# Patient Record
Sex: FEMALE | Race: OTHER | NOT HISPANIC OR LATINO | URBAN - METROPOLITAN AREA
[De-identification: names, ages, dates, MRNs, and addresses within clinical notes are randomized per-mention and may not be internally consistent; named-entity substitution may affect disease eponyms.]

---

## 2017-09-11 ENCOUNTER — EMERGENCY (EMERGENCY)
Facility: HOSPITAL | Age: 72
LOS: 1 days | Discharge: HOME | End: 2017-09-11
Attending: STUDENT IN AN ORGANIZED HEALTH CARE EDUCATION/TRAINING PROGRAM
Payer: SELF-PAY

## 2017-09-11 VITALS
SYSTOLIC BLOOD PRESSURE: 151 MMHG | HEART RATE: 60 BPM | RESPIRATION RATE: 18 BRPM | TEMPERATURE: 97 F | DIASTOLIC BLOOD PRESSURE: 81 MMHG | OXYGEN SATURATION: 99 %

## 2017-09-11 VITALS
OXYGEN SATURATION: 94 % | DIASTOLIC BLOOD PRESSURE: 76 MMHG | RESPIRATION RATE: 16 BRPM | TEMPERATURE: 98 F | SYSTOLIC BLOOD PRESSURE: 176 MMHG | HEART RATE: 72 BPM | WEIGHT: 143.08 LBS

## 2017-09-11 DIAGNOSIS — Z90.49 ACQUIRED ABSENCE OF OTHER SPECIFIED PARTS OF DIGESTIVE TRACT: Chronic | ICD-10-CM

## 2017-09-11 PROCEDURE — 99285 EMERGENCY DEPT VISIT HI MDM: CPT

## 2017-09-11 PROCEDURE — 70450 CT HEAD/BRAIN W/O DYE: CPT | Mod: 26

## 2017-09-11 PROCEDURE — 70486 CT MAXILLOFACIAL W/O DYE: CPT | Mod: 26

## 2017-09-11 RX ORDER — ACETAMINOPHEN 500 MG
975 TABLET ORAL ONCE
Qty: 0 | Refills: 0 | Status: COMPLETED | OUTPATIENT
Start: 2017-09-11 | End: 2017-09-11

## 2017-09-11 RX ADMIN — Medication 975 MILLIGRAM(S): at 20:14

## 2017-09-11 NOTE — ED STATDOCS - PROGRESS NOTE DETAILS
PA NOTE: Pt seen by intake physician and hpi/orders/plan reviewed. HPI confirmed. Physical confirmed. will follow up plan per intake. Patient cleared by trauma. Will follow-up with plastic surgeon in [place]. unable to print or send prescription to pharmacy. Called prescription.

## 2017-09-11 NOTE — ED ADULT TRIAGE NOTE - CHIEF COMPLAINT QUOTE
[place] and pain to the left side of her face. Went to the clinic today and sent here for HTN.  complaining of headache and nosebleed.

## 2017-09-11 NOTE — ED STATDOCS - ATTENDING CONTRIBUTION TO CARE
I, Negro Trimble, performed the initial face to face bedside interview with this patient regarding history of present illness, review of symptoms and relevant past medical, social and family history.  I completed an independent physical examination.  I was the initial provider who evaluated this patient. I have signed out the follow up of any pending tests (i.e. labs, radiological studies) to the ACP.  I have communicated the patient’s plan of care and disposition with the ACP.  The history, relevant review of systems, past medical and surgical history, medical decision making, and physical examination was documented by the scribe in my presence and I attest to the accuracy of the documentation.
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
## 2017-09-12 PROCEDURE — 70450 CT HEAD/BRAIN W/O DYE: CPT

## 2017-09-12 PROCEDURE — 99284 EMERGENCY DEPT VISIT MOD MDM: CPT | Mod: 25

## 2017-09-12 PROCEDURE — 70486 CT MAXILLOFACIAL W/O DYE: CPT

## 2017-09-12 RX ADMIN — Medication 1 TABLET(S): at 02:21

## 2017-09-12 NOTE — CONSULT NOTE ADULT - ATTENDING COMMENTS
The patient was seen and examined  Fell four days prior  Facial fractures as noted  These will likely be non-operatively managed  Can follow-up as an outpatient with a plastic or OMFS surgeon

## 2017-09-12 NOTE — CONSULT NOTE ADULT - SUBJECTIVE AND OBJECTIVE BOX
HPI:  71 y/o F pt with a PMHx of nose bleeds presents to the ED c/o headache, epistaxis s/p fall from standing 4 days ago. She c/o, L sided facial pain. Pt states that she fell on the L side of her face while in the airport. Went to PMD, who sent her into the ED for a medical evaluation. Denies chest pain, SOB, fever, chills, NUMBNESS, TINGLING, blurred vision, n/v/d or any other complaints. NKDA.    Allergies: NKDA  PMH: HTN  PSH: none      Vital Signs Last 24 Hrs  T(C): 36.3 (11 Sep 2017 20:50), Max: 36.6 (11 Sep 2017 18:34)  T(F): 97.4 (11 Sep 2017 20:50), Max: 97.8 (11 Sep 2017 18:34)  HR: 60 (11 Sep 2017 20:50) (60 - 72)  BP: 151/81 (11 Sep 2017 20:50) (151/81 - 176/76)  BP(mean): --  RR: 18 (11 Sep 2017 20:50) (16 - 18)  SpO2: 99% (11 Sep 2017 20:50) (94% - 99%)    Physical Exam:    Head: TTP left cheeck    Neurological:  No sensory/motor deficits    HEENT: PERRLA, EOMI, no drainage or redness    Neck: No bruits; no thyromegaly or nodules,  No JVD    Back: Normal spine flexure, No CVA tenderness, No deformity or limitation of movement    Respiratory: Breath Sounds equal & clear to auscultation, no accessory muscle use    Cardiovascular: Regular rate & rhythm, normal S1, S2; no murmurs, gallops or rubs    Gastrointestinal: Soft, non-tender, normal bowel sounds    Extremities: No peripheral edema, No cyanosis, clubbing     Vascular: Equal and normal pulses: 2+ peripheral pulses throughout    Musculoskeletal: No joint pain, swelling or deformity; no limitation of movement    Skin: No rashes      I&O's Detail      LABS:                RADIOLOGY & ADDITIONAL STUDIES:  CT head: 1. No acute territorial infarct, hemorrhage, mass effect or calvarial  fracture.  2. Comminuted, depressed fracture of the anterior and lateral walls of left  maxillary sinus.  3. Nondisplaced fracture of the left orbital floor/maxillary sinus roof. HPI:  73 y/o F pt with a PMHx of nose bleeds presents to the ED c/o headache, epistaxis s/p fall from standing 4 days ago. She c/o, L sided facial pain. Pt states that she fell on the L side of her [place] the airport. Went to PMD, who sent her into the ED for a medical evaluation. Denies chest pain, SOB, fever, chills, NUMBNESS, TINGLING, blurred vision, n/v/d or any other complaints. NKDA.    Allergies: NKDA  PMH: HTN  PSH: none      Vital Signs Last 24 Hrs  T(C): 36.3 (11 Sep 2017 20:50), Max: 36.6 (11 Sep 2017 18:34)  T(F): 97.4 (11 Sep 2017 20:50), Max: 97.8 (11 Sep 2017 18:34)  HR: 60 (11 Sep 2017 20:50) (60 - 72)  BP: 151/81 (11 Sep 2017 20:50) (151/81 - 176/76)  BP(mean): --  RR: 18 (11 Sep 2017 20:50) (16 - 18)  SpO2: 99% (11 Sep 2017 20:50) (94% - 99%)    Physical Exam:    Head: TTP left cheek    Neurological:  No sensory/motor deficits    HEENT: PERRLA, EOMI, no drainage or redness    Neck: No bruits; no thyromegaly or nodules,  No JVD    Back: Normal spine flexure, No CVA tenderness, No deformity or limitation of movement    Respiratory: Breath Sounds equal & clear to auscultation, no accessory muscle use    Cardiovascular: Regular rate & rhythm, normal S1, S2; no murmurs, gallops or rubs    Gastrointestinal: Soft, non-tender, normal bowel sounds    Extremities: No peripheral edema, No cyanosis, clubbing     Vascular: Equal and normal pulses: 2+ peripheral pulses throughout    Musculoskeletal: No joint pain, swelling or deformity; no limitation of movement    Skin: No rashes      I&O's Detail      LABS:                RADIOLOGY & ADDITIONAL STUDIES:  CT head: 1. No acute territorial infarct, hemorrhage, mass effect or calvarial  fracture.  2. Comminuted, depressed fracture of the anterior and lateral walls of left  maxillary sinus.  3. Nondisplaced fracture of the left orbital floor/maxillary sinus roof.

## 2017-09-12 NOTE — CONSULT NOTE ADULT - PROBLEM SELECTOR RECOMMENDATION 9
No acute surgical intervention at this time  Pt may f/u as outpatient for management of her maxillary fx  Case discussed with patient, said that she must return to [place]

## 2017-09-12 NOTE — CONSULT NOTE ADULT - ASSESSMENT
71 y/o F w. left depressed fx of left maxillary sinus, nondisplaced fx of Lt orbital floor maxillary sinus roof.